# Patient Record
(demographics unavailable — no encounter records)

---

## 2019-12-11 NOTE — REP
Five views left knee:  12/11/2019.

 

Indication:  Left knee pain and swelling.

 

Comparison:  None.

 

Findings:

 

There is no acute fracture, subluxation or dislocation.  Moderate sized joint

effusion is present.  No lytic or blastic lesions are detected. No significant

arthritic sequelae are present.

 

Impression:

 

No acute fracture.  Left knee joint effusion.

 

 

Electronically Signed by

Good Garcia DO 12/11/2019 12:14 P

## 2019-12-11 NOTE — REP
Left lower extremity Duplex Doppler venous ultrasound:

 

Real time compression and duplex Doppler interrogation of the left lower

extremity deep venous system is performed.  The left common femoral, superficial

femoral and popliteal veins are fully compressible with transducer pressure and

demonstrate normal spontaneous and phasic flow, without evidence of deep venous

thrombosis.

 

Impression:

 

No evidence of deep venous thrombosis of the left lower extremity femoral

popliteal venous system.  Note is made of a left popliteal cyst measuring 3.5 X

2.0 X 3.0 cm.

 

 

Electronically Signed by

Adolfo Pike MD 12/11/2019 12:33 P

## 2019-12-11 NOTE — REP
Chest x-ray:  Two views.

 

History: Chest pain .

 

Comparison study: No comparison .

 

Findings:  The lungs are well inflated and free of infiltrate.  The pleural

angles are sharp.  The heart size is normal.  Pulmonary vasculature is not

increased.  No significant bony abnormality is seen.

 

Impression:

 

Negative chest x-ray.

 

 

Electronically Signed by

Len Mills MD 12/11/2019 12:09 P

## 2019-12-12 NOTE — ECGEPIP
[facility] - ED

                                       

                                       Test Date:    2019

Pat Name:     SIMÓN KIM            Department:   

Patient ID:   [id number]                 Room:         -

Gender:       Female                   Technician:   TC

:          1970               Requested By: MICKEY LANGLEYP

Order Number: [id number]     Reading MD:   Yen Arreola

                                 Measurements

Intervals                              Axis          

Rate:         94                       P:            53

WY:           134                      QRS:          -10

QRSD:         85                       T:            55

QT:           339                                    

QTc:          426                                    

                           Interpretive Statements

SINUS RHYTHM

POSSIBLE LEFT ATRIAL ENLARGEMENT

POSSIBLE ANTERIOR MYOCARDIAL INFARCTION, OF INDETERMINATE AGE

NO PRIOR

Electronically Signed on 2019 17:51:28 EST by Yen Arreola
3 days

## 2019-12-26 NOTE — REPVR
PROCEDURE INFORMATION: 

Exam: CT Maxillofacial Without Contrast 

Exam date and time: 12/25/2019 10:46 PM 

Age: 49 years old 

Clinical indication: Other: Lock jaw; Additional info: Attn R tmj 



TECHNIQUE: 

Imaging protocol: Computed tomography images of the face without contrast. 

Axial, coronal and sagittal reformatted images were created and reviewed. 

Radiation optimization: All CT scans at this facility use at least one of these 

dose optimization techniques: automated exposure control; mA and/or kV 

adjustment per patient size (includes targeted exams where dose is matched to 

clinical indication); or iterative reconstruction. 



COMPARISON: 

CR TMJ'S Temperomandibular Joints 12/25/2019 9:59 PM 



FINDINGS: 

Orbits: No acute intraorbital abnormality. Globes intact. 

Sinuses: Mild ethmoid and frontal sinus mucosal thickening. 

Bones/joints: No acute fracture. 

Soft tissues: Unremarkable. 

Other findings: Poor dentition. 



IMPRESSION: 

1. No acute findings. Grossly normal-appearing temporomandibular joints. 

2. Additional findings, as above. 



Electronically signed by: Aniceto Nguyen On 12/26/2019  00:32:38 AM

## 2019-12-26 NOTE — REP
Clinical:  Pain.

 

Technique:  Five total views of the bilateral temporomandibular joints.

 

Findings:

Old images demonstrate the bilateral temporomandibular joints to be intact and

without obvious motion or change in position.

 

Impression:

The bilateral temporomandibular joints demonstrate unchanged position on the open

and closed mouth views.

 

 

Electronically Signed by

Darrell Manuel MD 12/26/2019 08:13 A

## 2019-12-26 NOTE — ECGEPIP
[facility] - ED

                                       

                                       Test Date:    2019

Pat Name:     SIMÓN KIM            Department:   

Patient ID:   [id number]                 Room:         -

Gender:       Female                   Technician:   KCJ

:          1970               Requested By: AMBROSIO FOWLER 

Order Number: [id number]     Reading MD:   Hugh Mclaughlin

                                 Measurements

Intervals                              Axis          

Rate:         111                      P:            70

OK:           130                      QRS:          -48

QRSD:         98                       T:            71

QT:           319                                    

QTc:          434                                    

                           Interpretive Statements

SINUS TACHYCARDIA

LEFT AXIS DEVIATION

SIMILAR TO 19

Electronically Signed on 2019 5:36:06 EST by Hugh Mclaughlin